# Patient Record
Sex: MALE | Race: WHITE | NOT HISPANIC OR LATINO | Employment: UNEMPLOYED | URBAN - NONMETROPOLITAN AREA
[De-identification: names, ages, dates, MRNs, and addresses within clinical notes are randomized per-mention and may not be internally consistent; named-entity substitution may affect disease eponyms.]

---

## 2019-03-18 ENCOUNTER — OFFICE VISIT (OUTPATIENT)
Dept: ORTHOPEDIC SURGERY | Facility: CLINIC | Age: 54
End: 2019-03-18

## 2019-03-18 ENCOUNTER — HOSPITAL ENCOUNTER (OUTPATIENT)
Dept: GENERAL RADIOLOGY | Facility: HOSPITAL | Age: 54
Discharge: HOME | End: 2019-03-18
Admitting: ORTHOPAEDIC SURGERY

## 2019-03-18 VITALS
WEIGHT: 225 LBS | HEIGHT: 73 IN | SYSTOLIC BLOOD PRESSURE: 137 MMHG | BODY MASS INDEX: 29.82 KG/M2 | HEART RATE: 110 BPM | DIASTOLIC BLOOD PRESSURE: 92 MMHG

## 2019-03-18 DIAGNOSIS — S89.92XA INJURY, KNEE, LEFT, INITIAL ENCOUNTER: Primary | ICD-10-CM

## 2019-03-18 DIAGNOSIS — S83.207A POSITIVE MCMURRAY SIGN (MENISCUS TEAR) OF LEFT KNEE, INITIAL ENCOUNTER: ICD-10-CM

## 2019-03-18 DIAGNOSIS — M25.562 ACUTE PAIN OF LEFT KNEE: ICD-10-CM

## 2019-03-18 DIAGNOSIS — M25.462 EFFUSION OF KNEE JOINT, LEFT: ICD-10-CM

## 2019-03-18 DIAGNOSIS — M25.562 MEDIAL JOINT LINE TENDERNESS OF LEFT KNEE: ICD-10-CM

## 2019-03-18 PROCEDURE — 99203 OFFICE O/P NEW LOW 30 MIN: CPT | Performed by: ORTHOPAEDIC SURGERY

## 2019-03-18 PROCEDURE — 73560 X-RAY EXAM OF KNEE 1 OR 2: CPT | Performed by: RADIOLOGY

## 2019-03-18 PROCEDURE — 73560 X-RAY EXAM OF KNEE 1 OR 2: CPT

## 2019-03-18 RX ORDER — ATORVASTATIN CALCIUM 80 MG/1
1 TABLET, FILM COATED ORAL
COMMUNITY
Start: 2019-01-17

## 2019-03-18 RX ORDER — ALLOPURINOL 300 MG/1
1 TABLET ORAL
COMMUNITY
Start: 2019-01-14

## 2019-03-18 RX ORDER — LISINOPRIL 10 MG/1
1 TABLET ORAL
COMMUNITY
Start: 2013-06-20

## 2019-03-18 RX ORDER — QUETIAPINE FUMARATE 200 MG/1
1 TABLET, FILM COATED ORAL
COMMUNITY

## 2019-03-18 RX ORDER — OMEPRAZOLE 20 MG/1
20 CAPSULE, DELAYED RELEASE ORAL DAILY
COMMUNITY

## 2019-03-18 RX ORDER — LEVOTHYROXINE SODIUM 0.1 MG/1
100 TABLET ORAL DAILY
COMMUNITY

## 2019-03-18 RX ORDER — CELECOXIB 200 MG/1
1 CAPSULE ORAL
COMMUNITY
Start: 2019-01-17

## 2019-03-18 RX ORDER — BENAZEPRIL HYDROCHLORIDE 10 MG/1
10 TABLET ORAL DAILY
COMMUNITY

## 2019-03-18 NOTE — PROGRESS NOTES
New Patient Visit      Patient: Silvano Holland  YOB: 1965  Date of Encounter: 03/18/2019        Chief Complaint:   Chief Complaint   Patient presents with   • Left Knee - Pain, Edema       HPI:   Silvano Holland, 53 y.o. male, referred by Kayce Fisher APRN presents for evaluation of left knee pain which began about a week ago when he fell. He was getting out of his bathtub slipped and twisted his knee and has had pain since with difficulty bearing weight.  He denies recent problems with his left knee but does acknowledge having left knee arthroscopy around 1980.    Active Problem List:  Patient Active Problem List   Diagnosis   • High cholesterol   • Hypertension   • Thyroid disease       Past Medical History:  Past Medical History:   Diagnosis Date   • Diabetes (CMS/HCC)    • Gout    • High cholesterol    • Hypertension    • Thyroid disease        Past Surgical History:  Past Surgical History:   Procedure Laterality Date   • KNEE ARTHROSCOPY Left        Family History:  Family History   Problem Relation Age of Onset   • Gout Brother        Social History:  Social History     Socioeconomic History   • Marital status: Single     Spouse name: Not on file   • Number of children: Not on file   • Years of education: Not on file   • Highest education level: Not on file   Tobacco Use   • Smoking status: Former Smoker   • Smokeless tobacco: Never Used   Substance and Sexual Activity   • Alcohol use: No     Frequency: Never   • Drug use: No     Patient's Body mass index is 29.69 kg/m². BMI is above normal parameters. Recommendations include: educational material.      Medications:  Current Outpatient Medications   Medication Sig Dispense Refill   • allopurinol (ZYLOPRIM) 300 MG tablet Take 1 tablet by mouth.     • atorvastatin (LIPITOR) 80 MG tablet Take 1 tablet by mouth.     • benazepril (LOTENSIN) 10 MG tablet Take 10 mg by mouth Daily.     • celecoxib (CeleBREX) 200 MG capsule Take 1 capsule by mouth.    "  • levothyroxine (SYNTHROID, LEVOTHROID) 100 MCG tablet Take 100 mcg by mouth Daily.     • lisinopril (PRINIVIL,ZESTRIL) 10 MG tablet Take 1 tablet by mouth.     • metFORMIN (GLUCOPHAGE) 500 MG tablet Take 1 tablet by mouth Every 12 (Twelve) Hours.     • metoprolol tartrate (LOPRESSOR) 25 MG tablet take 1 Tablet by oral route  every day at bedtime for HTN     • omeprazole (priLOSEC) 20 MG capsule Take 20 mg by mouth Daily.     • QUEtiapine (SEROQUEL) 200 MG tablet Take 1 tablet by mouth.       No current facility-administered medications for this visit.        Allergies:  Allergies not on file    Review of Systems:   Review of Systems   Constitutional: Negative.    HENT: Negative.    Eyes: Negative.    Respiratory: Negative.    Cardiovascular: Negative.    Gastrointestinal: Negative.    Endocrine: Negative.    Genitourinary: Negative.    Musculoskeletal: Positive for arthralgias and joint swelling.   Skin: Negative.    Allergic/Immunologic: Negative.    Neurological: Negative.    Hematological: Negative.    Psychiatric/Behavioral: Negative.        Physical Exam:   Physical Exam  GENERAL: 53 y.o. male, alert and oriented X 3 in no acute distress.   Visit Vitals  /92   Pulse 110   Ht 185.4 cm (72.99\")   Wt 102 kg (225 lb)   BMI 29.69 kg/m²     Musculoskeletal: Left knee evaluation reveals moderate effusion marked medial joint line tenderness no lateral joint line tenderness no gross instability with varus valgus stressing Lachman or drawer, he has full extension, flexes to 110 degrees strongly positive Debo's, patella with normal tracking minimal crepitance neurovascular grossly intact.    Radiology/Labs:   Radiographs left knee remarkable for mild irregularity lateral femoral condyle without significant joint space loss, medial compartment completely unremarkable.    Assessment & Plan:   53 y.o. male presents with twisting injury to his left knee,marked medial joint line tenderness normal radiographs and " strongly positive Debo sign.  Recommendation is that we obtain MRI left knee and see him back when completed.      ICD-10-CM ICD-9-CM   1. Injury, knee, left, initial encounter S89.92XA 959.7   2. Acute pain of left knee M25.562 719.46   3. Positive Debo sign (meniscus tear) of left knee, initial encounter S83.207A 836.2   4. Medial joint line tenderness of left knee M25.562 719.46   5. Effusion of knee joint, left M25.462 719.06               Cc:   Kayce Fisher APRN          Scribed for Cong Greenfield MD by Aleida Greenfield RN.10:00 AM 03/18/2019

## 2019-03-21 PROBLEM — E78.00 HIGH CHOLESTEROL: Status: ACTIVE | Noted: 2019-03-21

## 2019-03-21 PROBLEM — E07.9 THYROID DISEASE: Status: ACTIVE | Noted: 2019-03-21

## 2019-03-21 PROBLEM — I10 HYPERTENSION: Status: ACTIVE | Noted: 2019-03-21

## 2019-04-02 ENCOUNTER — TELEPHONE (OUTPATIENT)
Dept: ORTHOPEDIC SURGERY | Facility: CLINIC | Age: 54
End: 2019-04-02

## 2019-04-02 NOTE — TELEPHONE ENCOUNTER
Patient called asking about the schedule for an MRI, scheduling had tried to reach patient 3 times and was unsuccessful.  Patient was given the phone number to scheduling [phone] to schedule.

## 2019-04-05 ENCOUNTER — HOSPITAL ENCOUNTER (OUTPATIENT)
Dept: MRI IMAGING | Facility: HOSPITAL | Age: 54
Discharge: HOME | End: 2019-04-05
Admitting: ORTHOPAEDIC SURGERY

## 2019-04-05 DIAGNOSIS — S89.92XA INJURY, KNEE, LEFT, INITIAL ENCOUNTER: ICD-10-CM

## 2019-04-05 DIAGNOSIS — M25.562 ACUTE PAIN OF LEFT KNEE: ICD-10-CM

## 2019-04-05 DIAGNOSIS — S83.207A POSITIVE MCMURRAY SIGN (MENISCUS TEAR) OF LEFT KNEE, INITIAL ENCOUNTER: ICD-10-CM

## 2019-04-05 DIAGNOSIS — M25.562 MEDIAL JOINT LINE TENDERNESS OF LEFT KNEE: ICD-10-CM

## 2019-04-05 DIAGNOSIS — M25.462 EFFUSION OF KNEE JOINT, LEFT: ICD-10-CM

## 2019-04-05 PROCEDURE — 73721 MRI JNT OF LWR EXTRE W/O DYE: CPT | Performed by: RADIOLOGY

## 2019-04-05 PROCEDURE — 73721 MRI JNT OF LWR EXTRE W/O DYE: CPT

## 2019-04-12 ENCOUNTER — OFFICE VISIT (OUTPATIENT)
Dept: ORTHOPEDIC SURGERY | Facility: CLINIC | Age: 54
End: 2019-04-12

## 2019-04-12 VITALS — BODY MASS INDEX: 29.8 KG/M2 | HEIGHT: 73 IN | WEIGHT: 224.87 LBS

## 2019-04-12 DIAGNOSIS — S86.912D STRAIN OF LEFT KNEE, SUBSEQUENT ENCOUNTER: Primary | ICD-10-CM

## 2019-04-12 DIAGNOSIS — M25.662 DECREASED RANGE OF MOTION (ROM) OF LEFT KNEE: ICD-10-CM

## 2019-04-12 DIAGNOSIS — R29.898 WEAKNESS OF LEFT LEG: ICD-10-CM

## 2019-04-12 PROCEDURE — 99213 OFFICE O/P EST LOW 20 MIN: CPT | Performed by: ORTHOPAEDIC SURGERY

## 2019-04-12 NOTE — PROGRESS NOTES
Follow-up Visit         Patient: Silvano Holland  YOB: 1965  Date of Encounter: 04/12/2019      Chief  Complaint:   Chief Complaint   Patient presents with   • Left Knee - Pain, Follow-up         HPI:  Silvano Holland, 53 y.o. male returns in follow-up with his left knee complaints, he has now had MRI completed.  His pain began with an injury approximately 5 weeks ago.  Within the last week he feels he is doing better he has a knee immobilizer.  Now has MRI completed.    Medical History:  Patient Active Problem List   Diagnosis   • High cholesterol   • Hypertension   • Thyroid disease     Past Medical History:   Diagnosis Date   • Diabetes (CMS/HCC)    • Gout    • High cholesterol    • Hypertension    • Thyroid disease        Social History:  Social History     Socioeconomic History   • Marital status: Single     Spouse name: Not on file   • Number of children: Not on file   • Years of education: Not on file   • Highest education level: Not on file   Tobacco Use   • Smoking status: Former Smoker   • Smokeless tobacco: Never Used   Substance and Sexual Activity   • Alcohol use: No     Frequency: Never   • Drug use: No       Surgical History:  Past Surgical History:   Procedure Laterality Date   • KNEE ARTHROSCOPY Left        Radiology: MRI by review and by report shows moderate osteoarthritis with osteophytes off the medial lateral femoral condyles.  Grade 1 change within the medial lateral meniscus but no tear to the  articular surface.      Examination: Knee evaluation reveals no effusion, he has range of motion 0-120 degrees with no instability, patella with normal tracking, minimal joint line tenderness today.      Assessment & Plan:   53 y.o. male doing reasonably well today. I think he would improve with physical therapy and we will arrange that through home health to provide range of motion strengthening and progress out of his immobilizer, he will follow-up in 6 weeks.  He is to progress to full  weightbearing.         Diagnosis Plan   1. Strain of left knee, subsequent encounter  Ambulatory Referral to Home Health   2. Weakness of left leg  Ambulatory Referral to Home Health   3. Decreased range of motion (ROM) of left knee  Ambulatory Referral to Home Health             Cc:  Kayce Fisher APRN

## 2019-04-15 ENCOUNTER — TELEPHONE (OUTPATIENT)
Dept: ORTHOPEDIC SURGERY | Facility: CLINIC | Age: 54
End: 2019-04-15

## 2019-04-15 NOTE — TELEPHONE ENCOUNTER
[facility] was contacted on 04/12/19 to set up home health PT.  Demo, insurance information and face to face was faxed to [phone]    Fax confirmation was received.